# Patient Record
Sex: FEMALE | Race: WHITE | NOT HISPANIC OR LATINO | URBAN - METROPOLITAN AREA
[De-identification: names, ages, dates, MRNs, and addresses within clinical notes are randomized per-mention and may not be internally consistent; named-entity substitution may affect disease eponyms.]

---

## 2017-04-06 ENCOUNTER — IMPORTED ENCOUNTER (OUTPATIENT)
Dept: URBAN - METROPOLITAN AREA CLINIC 31 | Facility: CLINIC | Age: 22
End: 2017-04-06

## 2017-04-06 PROBLEM — H40.003: Noted: 2017-04-06

## 2017-04-06 PROCEDURE — 76514 ECHO EXAM OF EYE THICKNESS: CPT

## 2017-04-06 PROCEDURE — 99213 OFFICE O/P EST LOW 20 MIN: CPT

## 2017-04-06 NOTE — PATIENT DISCUSSION
1.  Glaucoma suspect OU - No signs of glaucomatous damage to the optic nerve based on todays examination and testing. Will continue to monitor. 2. Return for an appointment in 1 year for comprehensive exam. VF 24-2. with Dr. Gaby Aguilera.

## 2017-11-28 ENCOUNTER — IMPORTED ENCOUNTER (OUTPATIENT)
Dept: URBAN - METROPOLITAN AREA CLINIC 31 | Facility: CLINIC | Age: 22
End: 2017-11-28

## 2017-11-28 PROBLEM — H40.003: Noted: 2017-11-28

## 2017-11-28 PROBLEM — H01.005: Noted: 2017-11-28

## 2017-11-28 PROBLEM — H01.002: Noted: 2017-11-28

## 2017-11-28 PROBLEM — H01.004: Noted: 2017-11-28

## 2017-11-28 PROBLEM — H01.001: Noted: 2017-11-28

## 2017-11-28 PROCEDURE — 92012 INTRM OPH EXAM EST PATIENT: CPT

## 2017-11-28 NOTE — PATIENT DISCUSSION
1.  Glaucoma suspect OU - No signs of glaucomatous damage to the optic nerve based on todays examination and testing. Will continue to monitor. 2. Blepharitis anterior type OU - Maxitrol QID OU and E rachelle HS OU.3. Return for an appointment in 2 days for office call. with Dr. Isaac Peacock.

## 2017-12-01 ENCOUNTER — IMPORTED ENCOUNTER (OUTPATIENT)
Dept: URBAN - METROPOLITAN AREA CLINIC 31 | Facility: CLINIC | Age: 22
End: 2017-12-01

## 2017-12-01 PROBLEM — H40.003: Noted: 2017-12-01

## 2017-12-01 PROBLEM — H01.005: Noted: 2017-12-01

## 2017-12-01 PROBLEM — H01.004: Noted: 2017-12-01

## 2017-12-01 PROBLEM — H01.001: Noted: 2017-12-01

## 2017-12-01 PROBLEM — H01.002: Noted: 2017-12-01

## 2017-12-01 PROCEDURE — 99213 OFFICE O/P EST LOW 20 MIN: CPT

## 2017-12-01 NOTE — PATIENT DISCUSSION
1.  Glaucoma suspect OU - No signs of glaucomatous damage to the optic nerve based on todays examination and testing. Will continue to monitor. 2. Blepharitis anterior type OU - The patient exhibits reddened crusty eyelid margins. Warm compresses and lid scrubs were recommended. Antibiotic rachelle to lid margin qhs. Artificial Tears to be used as needed for discomfort. 3. Return for an appointment in 6 months for comprehensive exam. with Dr. Jose Laird.

## 2018-01-05 ENCOUNTER — IMPORTED ENCOUNTER (OUTPATIENT)
Dept: URBAN - METROPOLITAN AREA CLINIC 31 | Facility: CLINIC | Age: 23
End: 2018-01-05

## 2018-01-05 PROBLEM — H01.004: Noted: 2018-01-05

## 2018-01-05 PROBLEM — H01.001: Noted: 2018-01-05

## 2018-01-05 PROBLEM — H01.002: Noted: 2018-01-05

## 2018-01-05 PROBLEM — H01.005: Noted: 2018-01-05

## 2018-01-05 PROBLEM — H40.003: Noted: 2018-01-05

## 2018-01-05 PROCEDURE — 99213 OFFICE O/P EST LOW 20 MIN: CPT

## 2018-01-05 NOTE — PATIENT DISCUSSION
1.  Glaucoma suspect OU - No signs of glaucomatous damage to the optic nerve based on todays examination and testing. Will continue to monitor. 2. Blepharitis anterior type OU - Significantly improved. Continue Ocusoft Plus QAM and E rachelle HS. 3. Return for an appointment in 6 months for comprehensive exam. with Dr. Christina Pagan

## 2018-05-23 ENCOUNTER — IMPORTED ENCOUNTER (OUTPATIENT)
Dept: URBAN - METROPOLITAN AREA CLINIC 31 | Facility: CLINIC | Age: 23
End: 2018-05-23

## 2018-05-23 PROCEDURE — 92014 COMPRE OPH EXAM EST PT 1/>: CPT

## 2018-05-23 PROCEDURE — 92133 CPTRZD OPH DX IMG PST SGM ON: CPT

## 2018-05-23 NOTE — PATIENT DISCUSSION
1.  Glaucoma suspect - stable discs IOPs. Longstanding large cups and asymmetry. 2. Return for an appointment in 8 months for comprehensive exam. OCT Nerve. with Dr. Tsering Woods.

## 2018-05-23 NOTE — PATIENT DISCUSSION
Return for an appointment in 8 months for comprehensive exam. OCT Nerve. with Dr. Stephany Slaughter.

## 2018-06-27 ENCOUNTER — IMPORTED ENCOUNTER (OUTPATIENT)
Dept: URBAN - METROPOLITAN AREA CLINIC 31 | Facility: CLINIC | Age: 23
End: 2018-06-27

## 2018-06-27 PROBLEM — R51: Noted: 2018-06-27

## 2018-06-27 PROCEDURE — 99213 OFFICE O/P EST LOW 20 MIN: CPT

## 2018-06-27 PROCEDURE — 92083 EXTENDED VISUAL FIELD XM: CPT

## 2018-06-27 NOTE — PATIENT DISCUSSION
1.  Headache of non-ocular origin - possibly secondary to large pupils and light iris. Recommend driving glasses with tint. 2. Return for an appointment in 1 year for comprehensive exam. with Dr. Lashon Michel.

## 2020-12-21 ENCOUNTER — IMPORTED ENCOUNTER (OUTPATIENT)
Dept: URBAN - METROPOLITAN AREA CLINIC 31 | Facility: CLINIC | Age: 25
End: 2020-12-21

## 2020-12-21 PROBLEM — H40.013: Noted: 2020-12-21

## 2020-12-21 PROCEDURE — 92133 CPTRZD OPH DX IMG PST SGM ON: CPT

## 2020-12-21 PROCEDURE — 92014 COMPRE OPH EXAM EST PT 1/>: CPT

## 2020-12-21 NOTE — PATIENT DISCUSSION
1.  Glaucoma suspect OU - No signs of glaucoma starting based on todays examination and testing. Stable OCT OU with mild NFL depression. 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  Will continue to monitor for glaucoma development. 2. Refractive error - No glasses necessary. 3.  Return for an appointment in 1 year for comprehensive exam. OCT Nerve. with Dr. Lupis Beasley

## 2022-03-24 NOTE — PATIENT DISCUSSION
Retinal tear and detachment warning symptoms reviewed and patient instructed to call immediately if increasing floaters, flashes, or decreasing peripheral vision. independent

## 2022-04-02 ASSESSMENT — VISUAL ACUITY
OD_CC: 20/20
OS_CC: 20/25
OD_CC: 20/20
OS_CC: 20/20
OS_CC: 20/20
OU_CC: 20/20
OD_CC: 20/20
OD_CC: 20/20
OD_SC: J1+
OS_CC: 20/20
OD_CC: 20/20
OS_CC: 20/20
OD_CC: 20/20
OD_CC: 20/25
OS_CC: 20/25
OS_CC: 20/20
OS_SC: J1+

## 2022-04-02 ASSESSMENT — TONOMETRY
OD_IOP_MMHG: 15
OD_IOP_MMHG: 14
OS_IOP_MMHG: 16
OS_IOP_MMHG: 14
OD_IOP_MMHG: 17
OS_IOP_MMHG: 14
OS_IOP_MMHG: 17
OD_IOP_MMHG: 17

## 2024-02-12 NOTE — PATIENT DISCUSSION
Continue on Erythromycin Nixon along lash line HS OU.
Despite some risk factors, the patient does not demonstrate definitive evidence of glaucoma at this time.
Patient advised to call if any problems, questions, or concerns.
Patient made aware of 24/7 emergency services.
Patient understands condition, prognosis and need for follow up care.
Significantly improved. Monitor.
The OCT is stable.
Ambulatory